# Patient Record
Sex: FEMALE | HISPANIC OR LATINO | Employment: UNEMPLOYED | URBAN - METROPOLITAN AREA
[De-identification: names, ages, dates, MRNs, and addresses within clinical notes are randomized per-mention and may not be internally consistent; named-entity substitution may affect disease eponyms.]

---

## 2021-01-15 DIAGNOSIS — Z23 NEED FOR VACCINATION: ICD-10-CM

## 2024-10-31 ENCOUNTER — OFFICE VISIT (OUTPATIENT)
Dept: URGENT CARE | Facility: CLINIC | Age: 74
End: 2024-10-31
Payer: MEDICARE

## 2024-10-31 VITALS
HEART RATE: 87 BPM | HEIGHT: 62 IN | WEIGHT: 164.2 LBS | RESPIRATION RATE: 15 BRPM | TEMPERATURE: 98.4 F | SYSTOLIC BLOOD PRESSURE: 140 MMHG | OXYGEN SATURATION: 94 % | DIASTOLIC BLOOD PRESSURE: 72 MMHG | BODY MASS INDEX: 30.22 KG/M2

## 2024-10-31 DIAGNOSIS — K21.9 GASTROESOPHAGEAL REFLUX DISEASE, UNSPECIFIED WHETHER ESOPHAGITIS PRESENT: ICD-10-CM

## 2024-10-31 PROCEDURE — 3078F DIAST BP <80 MM HG: CPT

## 2024-10-31 PROCEDURE — 3077F SYST BP >= 140 MM HG: CPT

## 2024-10-31 PROCEDURE — 99214 OFFICE O/P EST MOD 30 MIN: CPT

## 2024-10-31 RX ORDER — BACLOFEN 10 MG/1
10 TABLET ORAL NIGHTLY PRN
Qty: 30 TABLET | Refills: 0 | Status: SHIPPED | OUTPATIENT
Start: 2024-10-31 | End: 2024-10-31

## 2024-10-31 RX ORDER — FAMOTIDINE 20 MG/1
20 TABLET, FILM COATED ORAL 2 TIMES DAILY
Qty: 60 TABLET | Refills: 0 | Status: SHIPPED | OUTPATIENT
Start: 2024-10-31

## 2024-10-31 RX ORDER — SIMETHICONE 80 MG
80 TABLET,CHEWABLE ORAL EVERY 6 HOURS PRN
Qty: 30 TABLET | Refills: 3 | Status: SHIPPED | OUTPATIENT
Start: 2024-10-31

## 2024-10-31 RX ORDER — BACLOFEN 10 MG/1
10 TABLET ORAL NIGHTLY PRN
Qty: 30 TABLET | Refills: 0 | Status: SHIPPED | OUTPATIENT
Start: 2024-10-31

## 2024-10-31 RX ORDER — FAMOTIDINE 20 MG/1
20 TABLET, FILM COATED ORAL 2 TIMES DAILY
Qty: 60 TABLET | Refills: 0 | Status: SHIPPED | OUTPATIENT
Start: 2024-10-31 | End: 2024-10-31

## 2024-10-31 RX ORDER — LISINOPRIL 5 MG/1
5 TABLET ORAL DAILY
COMMUNITY

## 2024-11-01 ASSESSMENT — ENCOUNTER SYMPTOMS
HEADACHES: 0
PALPITATIONS: 0
SORE THROAT: 0
VOMITING: 0
DIARRHEA: 0
FEVER: 0
CHOKING: 0
SHORTNESS OF BREATH: 0
WEIGHT LOSS: 0
HEMOPTYSIS: 0
BELCHING: 1
CHILLS: 0
ABDOMINAL PAIN: 0
WATER BRASH: 0
GLOBUS SENSATION: 0
FATIGUE: 0
HOARSE VOICE: 0
PND: 0
EARLY SATIETY: 0
ORTHOPNEA: 0
COUGH: 1
BLOOD IN STOOL: 0
NAUSEA: 0
CLAUDICATION: 0
SPUTUM PRODUCTION: 0
CONSTIPATION: 0
HEARTBURN: 1
WHEEZING: 0
STRIDOR: 0
ORTHOPNEA: 0

## 2024-11-01 NOTE — PROGRESS NOTES
"Subjective:   Shraddha Chong is a 74 y.o. female who presents for Gastrophageal Reflux (X 1 week been having acid refluxes , suspended omeprazole due to causing more acid refluxes . Heart bun all day)      Gastrophageal Reflux      ROS  Refer to HPI for additional details.    During this visit, appropriate PPE was worn, and hand hygiene was performed.    PMH:  has a past medical history of Hypertension and Type II or unspecified type diabetes mellitus without mention of complication, not stated as uncontrolled.    MEDS:   Current Outpatient Medications:   •  omeprazole (PRILOSEC) 20 MG delayed-release capsule, Take 20 mg by mouth every day., Disp: , Rfl:   •  lisinopril (PRINIVIL) 5 MG Tab, Take 5 mg by mouth every day., Disp: , Rfl:   •  ibuprofen (MOTRIN) 600 MG TABS, Take 1 Tab by mouth every 6 hours as needed., Disp: 20 Tab, Rfl: 0  •  NON SPECIFIED, BP medication, Disp: , Rfl:   •  metformin (GLUCOPHAGE) 500 MG TABS, Take 500 mg by mouth 2 times a day, with meals., Disp: , Rfl:   •  cyclobenzaprine (FLEXERIL) 10 MG TABS, Take 5 mg by mouth 3 times a day as needed. (Patient not taking: Reported on 10/31/2024), Disp: , Rfl:   •  lansoprazole (PREVACID) 30 MG CPDR, Take 30 mg by mouth every day.   (Patient not taking: Reported on 10/31/2024), Disp: , Rfl:     ALLERGIES: No Known Allergies  SURGHX: History reviewed. No pertinent surgical history.  SOCHX:  reports that she has never smoked. She has never used smokeless tobacco. She reports that she does not drink alcohol and does not use drugs.    FH: Per HPI as applicable/pertinent.    Medications, Allergies, and current problem list reviewed today in Epic.     Objective:     BP (!) 140/72   Pulse 87   Temp 36.9 °C (98.4 °F)   Resp 15   Ht 1.575 m (5' 2\")   Wt 74.5 kg (164 lb 3.2 oz)   SpO2 94%     Physical Exam    Assessment/Plan:     Diagnosis and associated orders:     There are no diagnoses linked to this encounter.   Comments/MDM:        "       Differential diagnosis, natural history, supportive care, and indications for immediate follow-up discussed.    Advised the patient to follow-up with the primary care physician for recheck, reevaluation, and consideration of further management.    Please note that this dictation was created using voice recognition software. I have made a reasonable attempt to correct obvious errors, but I expect that there are errors of grammar and possibly content that I did not discover before finalizing the note.    This note was electronically signed by DEVON Jackson       "Tab, Take 5 mg by mouth every day., Disp: , Rfl:     simethicone (MYLICON) 80 MG Chew Tab, Chew 1 Tablet every 6 hours as needed for Flatulence., Disp: 30 Tablet, Rfl: 3    baclofen (LIORESAL) 10 MG Tab, Take 1 Tablet by mouth at bedtime as needed (for persistent acid reflux or heartburn)., Disp: 30 Tablet, Rfl: 0    famotidine (PEPCID) 20 MG Tab, Take 1 Tablet by mouth 2 times a day., Disp: 60 Tablet, Rfl: 0    ibuprofen (MOTRIN) 600 MG TABS, Take 1 Tab by mouth every 6 hours as needed., Disp: 20 Tab, Rfl: 0    NON SPECIFIED, BP medication, Disp: , Rfl:     metformin (GLUCOPHAGE) 500 MG TABS, Take 500 mg by mouth 2 times a day, with meals., Disp: , Rfl:     ALLERGIES: No Known Allergies  SURGHX: History reviewed. No pertinent surgical history.  SOCHX:  reports that she has never smoked. She has never used smokeless tobacco. She reports that she does not drink alcohol and does not use drugs.    FH: Per HPI as applicable/pertinent.    Medications, Allergies, and current problem list reviewed today in Epic.     Objective:     BP (!) 140/72   Pulse 87   Temp 36.9 °C (98.4 °F)   Resp 15   Ht 1.575 m (5' 2\")   Wt 74.5 kg (164 lb 3.2 oz)   SpO2 94%     Physical Exam  Vitals reviewed.   Constitutional:       General: She is not in acute distress.     Appearance: She is not ill-appearing.   HENT:      Nose: Nose normal. No congestion or rhinorrhea.      Mouth/Throat:      Mouth: Mucous membranes are moist.      Pharynx: Oropharynx is clear. No oropharyngeal exudate or posterior oropharyngeal erythema.      Comments: No obvious signs of erosion or bleeding.  Dentition is intact no molar decay or signs of infection.  No PTA.  Tonsils are without swelling erythema or exudates  Eyes:      Pupils: Pupils are equal, round, and reactive to light.   Cardiovascular:      Rate and Rhythm: Normal rate.      Pulses: Normal pulses.   Pulmonary:      Effort: Pulmonary effort is normal.   Abdominal:      General: Abdomen is flat. " There is no distension.      Palpations: There is no mass.      Tenderness: There is no abdominal tenderness. There is no right CVA tenderness, left CVA tenderness, guarding or rebound.      Hernia: No hernia is present.   Genitourinary:     Comments: Deferred and not indicated  Musculoskeletal:      Cervical back: Normal range of motion.   Skin:     General: Skin is warm and dry.   Neurological:      Mental Status: She is alert.         Assessment/Plan:     Diagnosis and associated orders:     1. Gastroesophageal reflux disease, unspecified whether esophagitis present  - Referral to Gastroenterology  - simethicone (MYLICON) 80 MG Chew Tab; Chew 1 Tablet every 6 hours as needed for Flatulence.  Dispense: 30 Tablet; Refill: 3  - baclofen (LIORESAL) 10 MG Tab; Take 1 Tablet by mouth at bedtime as needed (for persistent acid reflux or heartburn).  Dispense: 30 Tablet; Refill: 0  - famotidine (PEPCID) 20 MG Tab; Take 1 Tablet by mouth 2 times a day.  Dispense: 60 Tablet; Refill: 0    Other orders  - lisinopril (PRINIVIL) 5 MG Tab; Take 5 mg by mouth every day.     Comments/MDM:     Patient history and physical exam are consistent with acute episode of breakthrough esophagitis/acid reflux on chronic gastroesophageal reflux.  Patient has had relief from her symptoms over the last few years by managing her diet as well as daily omeprazole with as needed Tums.  In the last week her symptoms have not been responsive to these therapies and are starting to affect her sleep and ADLs.  In clinic she is overall benign presenting, though she does endorse her frustration and not being able to find relief.  I do have some suspicion that patient may be experiencing symptoms from lack of efficacy in her PPI due to prolonged use without intermittent breaks.  I discussed with patient that I do think she should establish with a GI as this has been an ongoing problem and she is unable to manage with frontline therapies and  treatment.  Patient was given GI cocktail in clinic, stated afforded her much relief.  I discussed extensively with patient and her daughter about treatment plan.  At this time I advised to discontinue use of PPI, can start famotidine with as needed simethicone, and as needed baclofen nightly to help with nighttime symptoms and esophageal relaxation.  I did advise that I do not think patient should be on this medication long-term, though for now she can use these meds until she can be evaluated by GI.  Outpatient management will consist of continued dietary modifications, hydration, increase exercise, continue no eating ideally 4 hours prior to lying down and sleeping, start famotidine twice daily, discontinue PPI, baclofen as needed  Follow up in 3-5 days if no improvement in symptoms  ED precautions given for any esophageal bleeding, hematemesis, substernal radiating chest pain.  Discussion and collaborative decision making used with the patient myself to develop treatment plan.  Patient understands the treatment plan of care, and further follow-up if needed.  No further questions           Differential diagnosis, natural history, supportive care, and indications for immediate follow-up discussed.    Advised the patient to follow-up with the primary care physician for recheck, reevaluation, and consideration of further management.    Please note that this dictation was created using voice recognition software. I have made a reasonable attempt to correct obvious errors, but I expect that there are errors of grammar and possibly content that I did not discover before finalizing the note.    This note was electronically signed by DVEON Jackson

## 2024-11-20 ENCOUNTER — OFFICE VISIT (OUTPATIENT)
Dept: URGENT CARE | Facility: PHYSICIAN GROUP | Age: 74
End: 2024-11-20
Payer: MEDICARE

## 2024-11-20 VITALS
TEMPERATURE: 98.4 F | HEIGHT: 62 IN | SYSTOLIC BLOOD PRESSURE: 110 MMHG | BODY MASS INDEX: 29.63 KG/M2 | WEIGHT: 161 LBS | DIASTOLIC BLOOD PRESSURE: 58 MMHG | OXYGEN SATURATION: 95 % | HEART RATE: 84 BPM | RESPIRATION RATE: 16 BRPM

## 2024-11-20 DIAGNOSIS — K21.9 GASTROESOPHAGEAL REFLUX DISEASE, UNSPECIFIED WHETHER ESOPHAGITIS PRESENT: ICD-10-CM

## 2024-11-20 PROCEDURE — 3074F SYST BP LT 130 MM HG: CPT | Performed by: FAMILY MEDICINE

## 2024-11-20 PROCEDURE — 99213 OFFICE O/P EST LOW 20 MIN: CPT | Performed by: FAMILY MEDICINE

## 2024-11-20 PROCEDURE — 3078F DIAST BP <80 MM HG: CPT | Performed by: FAMILY MEDICINE

## 2024-11-20 RX ORDER — LANSOPRAZOLE 30 MG/1
30 CAPSULE, DELAYED RELEASE ORAL DAILY
Qty: 30 CAPSULE | Refills: 0 | Status: SHIPPED | OUTPATIENT
Start: 2024-11-20 | End: 2024-12-20

## 2024-11-20 NOTE — PROGRESS NOTES
"Subjective     Shraddha Chong is a 74 y.o. female who presents with Abdominal Pain (Since 10-31-24 with GERD.  Daughter states that BP dropped on Saturday and she felt faint. GI appointment in a month. )            3 weeks acid reflux. Worse after eating. Worse after laying down. Constipation with antacids. Gets temporary relief with Tums. No caffeine, no EtOH. No chocolate consumption.  Last night had severe symptoms despite famotidine.  Normal BM/no melena.  Has GI follow-up scheduled.  No other aggravating or alleviating factors.        Review of Systems   Constitutional:  Negative for malaise/fatigue and weight loss.   Eyes:  Negative for discharge and redness.   Gastrointestinal:  Negative for nausea and vomiting.   Musculoskeletal:  Negative for joint pain and myalgias.   Skin:  Negative for itching and rash.              Objective     /58   Pulse 84   Temp 36.9 °C (98.4 °F) (Temporal)   Resp 16   Ht 1.575 m (5' 2\")   Wt 73 kg (161 lb)   SpO2 95%   BMI 29.45 kg/m²      Physical Exam  Constitutional:       General: She is not in acute distress.     Appearance: She is well-developed.   HENT:      Head: Normocephalic and atraumatic.      Mouth/Throat:      Mouth: Mucous membranes are moist.      Pharynx: No posterior oropharyngeal erythema.   Eyes:      Conjunctiva/sclera: Conjunctivae normal.   Cardiovascular:      Rate and Rhythm: Normal rate and regular rhythm.      Heart sounds: Normal heart sounds. No murmur heard.  Pulmonary:      Effort: Pulmonary effort is normal.      Breath sounds: Normal breath sounds. No wheezing.   Abdominal:      Palpations: Abdomen is soft.      Tenderness: There is abdominal tenderness (Mild epigastric).   Skin:     General: Skin is warm and dry.      Findings: No rash.   Neurological:      Mental Status: She is alert.                             Assessment & Plan   Urgent care visit 10/31/2024 reviewed     Assessment & Plan  Gastroesophageal reflux disease, " unspecified whether esophagitis present    Orders:    lansoprazole (PREVACID) 30 MG CAPSULE DELAYED RELEASE; Take 1 Capsule by mouth every day for 30 days.     Differential diagnosis, natural history, supportive care, and indications for immediate follow-up were discussed.     Recommend small meals, more frequent if necessary.    F/u GI

## 2024-11-21 ASSESSMENT — ENCOUNTER SYMPTOMS
NAUSEA: 0
EYE DISCHARGE: 0
MYALGIAS: 0
VOMITING: 0
WEIGHT LOSS: 0
EYE REDNESS: 0

## 2024-12-12 DIAGNOSIS — K21.9 GASTROESOPHAGEAL REFLUX DISEASE, UNSPECIFIED WHETHER ESOPHAGITIS PRESENT: ICD-10-CM

## 2024-12-17 RX ORDER — LANSOPRAZOLE 30 MG/1
CAPSULE, DELAYED RELEASE ORAL
Qty: 90 CAPSULE | Refills: 1 | Status: SHIPPED | OUTPATIENT
Start: 2024-12-17

## 2025-01-03 ENCOUNTER — HOSPITAL ENCOUNTER (OUTPATIENT)
Dept: RADIOLOGY | Facility: MEDICAL CENTER | Age: 75
End: 2025-01-03
Attending: NURSE PRACTITIONER
Payer: MEDICARE

## 2025-01-03 DIAGNOSIS — K21.9 CHALASIA OF LOWER ESOPHAGEAL SPHINCTER: ICD-10-CM

## 2025-01-03 PROCEDURE — 74220 X-RAY XM ESOPHAGUS 1CNTRST: CPT

## 2025-01-03 PROCEDURE — 700117 HCHG RX CONTRAST REV CODE 255: Performed by: NURSE PRACTITIONER

## 2025-01-03 RX ADMIN — BARIUM SULFATE 700 MG: 700 TABLET ORAL at 12:22

## 2025-01-20 ENCOUNTER — OFFICE VISIT (OUTPATIENT)
Dept: MEDICAL GROUP | Age: 75
End: 2025-01-20
Payer: MEDICARE

## 2025-01-20 VITALS
BODY MASS INDEX: 29.7 KG/M2 | WEIGHT: 161.4 LBS | DIASTOLIC BLOOD PRESSURE: 60 MMHG | TEMPERATURE: 98 F | SYSTOLIC BLOOD PRESSURE: 130 MMHG | OXYGEN SATURATION: 98 % | HEART RATE: 74 BPM | HEIGHT: 62 IN

## 2025-01-20 DIAGNOSIS — Z12.11 COLON CANCER SCREENING: ICD-10-CM

## 2025-01-20 DIAGNOSIS — I10 ESSENTIAL HYPERTENSION: ICD-10-CM

## 2025-01-20 DIAGNOSIS — R73.03 PREDIABETES: ICD-10-CM

## 2025-01-20 DIAGNOSIS — Z11.59 ENCOUNTER FOR HCV SCREENING TEST FOR HIGH RISK PATIENT: ICD-10-CM

## 2025-01-20 DIAGNOSIS — K21.9 GASTROESOPHAGEAL REFLUX DISEASE WITHOUT ESOPHAGITIS: ICD-10-CM

## 2025-01-20 DIAGNOSIS — Z78.0 POST-MENOPAUSAL: ICD-10-CM

## 2025-01-20 DIAGNOSIS — E55.9 VITAMIN D INSUFFICIENCY: ICD-10-CM

## 2025-01-20 DIAGNOSIS — E78.5 DYSLIPIDEMIA: ICD-10-CM

## 2025-01-20 DIAGNOSIS — Z91.89 ENCOUNTER FOR HCV SCREENING TEST FOR HIGH RISK PATIENT: ICD-10-CM

## 2025-01-20 PROCEDURE — 3078F DIAST BP <80 MM HG: CPT | Performed by: STUDENT IN AN ORGANIZED HEALTH CARE EDUCATION/TRAINING PROGRAM

## 2025-01-20 PROCEDURE — 3075F SYST BP GE 130 - 139MM HG: CPT | Performed by: STUDENT IN AN ORGANIZED HEALTH CARE EDUCATION/TRAINING PROGRAM

## 2025-01-20 PROCEDURE — 99214 OFFICE O/P EST MOD 30 MIN: CPT | Performed by: STUDENT IN AN ORGANIZED HEALTH CARE EDUCATION/TRAINING PROGRAM

## 2025-01-20 ASSESSMENT — ENCOUNTER SYMPTOMS
CHILLS: 0
BRUISES/BLEEDS EASILY: 0
PALPITATIONS: 0
DOUBLE VISION: 0
ORTHOPNEA: 0
BLOOD IN STOOL: 0
DEPRESSION: 0
SHORTNESS OF BREATH: 0
ABDOMINAL PAIN: 0
HEADACHES: 0
DIZZINESS: 0
BACK PAIN: 0
COUGH: 0
WEAKNESS: 0
BLURRED VISION: 0
WHEEZING: 0
FEVER: 0

## 2025-01-20 ASSESSMENT — PATIENT HEALTH QUESTIONNAIRE - PHQ9: CLINICAL INTERPRETATION OF PHQ2 SCORE: 0

## 2025-01-20 NOTE — PROGRESS NOTES
"Subjective:     CC: Establish care, new patient.    HISTORY OF THE PRESENT ILLNESS:   Patient is a 74 y.o. female. This pleasant patient is here today to establish care and discuss chronic conditions, current medication therapy and screening.  [facility]  No records available from recent visits to her previous primary care physician.  Patient also has not had blood work done in over a year.  She otherwise states feeling well.  She has been treated for hypertension, GERD, and prediabetes.  Patient is currently taking lisinopril 10 mg daily, metformin 850 mg daily and lansoprazole 30 mg daily.  Recently she has been following with a gastroenterologist, however she cannot recall the physician's name.      ROS:   Review of Systems   Constitutional:  Negative for chills, fever and malaise/fatigue.   HENT:  Negative for nosebleeds and tinnitus.    Eyes:  Negative for blurred vision and double vision.   Respiratory:  Negative for cough, shortness of breath and wheezing.    Cardiovascular:  Negative for chest pain, palpitations, orthopnea and leg swelling.   Gastrointestinal:  Negative for abdominal pain, blood in stool and melena.   Genitourinary:  Negative for dysuria and urgency.   Musculoskeletal:  Negative for back pain and joint pain.   Skin:  Negative for rash.   Neurological:  Negative for dizziness, weakness and headaches.   Endo/Heme/Allergies:  Does not bruise/bleed easily.   Psychiatric/Behavioral:  Negative for depression and suicidal ideas.          Objective:     Exam: /60 (BP Location: Left arm, Patient Position: Sitting, BP Cuff Size: Large adult)   Pulse 74   Temp 36.7 °C (98 °F) (Temporal)   Ht 1.575 m (5' 2\")   Wt 73.2 kg (161 lb 6.4 oz)   SpO2 98%  Body mass index is 29.52 kg/m².    Physical Exam  Vitals reviewed.   Constitutional:       General: She is not in acute distress.  HENT:      Head: Normocephalic and atraumatic.      " Mouth/Throat:      Mouth: Mucous membranes are moist.   Eyes:      General: No scleral icterus.     Extraocular Movements: Extraocular movements intact.      Pupils: Pupils are equal, round, and reactive to light.   Cardiovascular:      Rate and Rhythm: Normal rate and regular rhythm.      Pulses: Normal pulses.      Heart sounds: Normal heart sounds. No murmur heard.  Pulmonary:      Effort: Pulmonary effort is normal. No respiratory distress.      Breath sounds: Normal breath sounds. No wheezing.   Abdominal:      General: There is no distension.      Palpations: Abdomen is soft.      Tenderness: There is no abdominal tenderness. There is no guarding or rebound.   Musculoskeletal:      Cervical back: Normal range of motion and neck supple.      Right lower leg: No edema.      Left lower leg: No edema.   Skin:     General: Skin is warm.      Capillary Refill: Capillary refill takes less than 2 seconds.      Coloration: Skin is not jaundiced.   Neurological:      General: No focal deficit present.      Mental Status: She is alert.      Cranial Nerves: No cranial nerve deficit.      Sensory: No sensory deficit.   Psychiatric:         Mood and Affect: Mood normal.         Behavior: Behavior normal.       Labs: Ordered    Assessment & Plan:   74 y.o. female with the following -    1. Essential hypertension  -Chronic, apparently stable  -Patient stated her blood pressure at home is regularly at goal  -Blood pressure today at our office was 130/60  -She is currently taking lisinopril 10 mg daily.  -Encourage weight loss, regular exercise, continue same therapy and monitor blood pressure at home at least 2 or 3 times per week.    - CBC WITH DIFFERENTIAL; Future  - Comp Metabolic Panel; Future  - TSH; Future    2. Dyslipidemia  -History of dyslipidemia, however she is not taking any medical therapy and I do not see any blood work done recently in her chart.  -Recommend to repeat blood work to calculate ASCVD score    -  Lipid Profile; Future    3. Prediabetes  -Unclear if she was diagnosed with prediabetes or diabetes since she has been on medical therapy with metformin 850 mg daily  -Per chart review no recent A1c  -Recommended to check A1c right now and also stop metformin  -I will also recheck her A1c in another 3 months    - HEMOGLOBIN A1C; Future  - VITAMIN B12; Future    4. Gastroesophageal reflux disease without esophagitis  -Chronic, apparently stable  -Currently following with a gastroenterologist  -She is taking lansoprazole 30 mg daily    5. Vitamin D insufficiency  -Screening for vitamin D deficiency/insufficiency  - VITAMIN D,25 HYDROXY (DEFICIENCY); Future    6. Post-menopausal  -Screening for osteoporosis  - DS-BONE DENSITY STUDY (DEXA); Future    7. Colon cancer screening  -Due for screening  - Referral to GI for Colonoscopy    8. Encounter for HCV screening test for high risk patient  -Routine screening  - HEP C VIRUS ANTIBODY; Future       Return in about 2 weeks (around 2/3/2025) for Labs.    Please note that this dictation was created using voice recognition software. I have made every reasonable attempt to correct obvious errors, but I expect that there are errors of grammar and possibly content that I did not discover before finalizing the note.

## 2025-01-22 ENCOUNTER — HOSPITAL ENCOUNTER (OUTPATIENT)
Dept: RADIOLOGY | Facility: MEDICAL CENTER | Age: 75
End: 2025-01-22
Attending: STUDENT IN AN ORGANIZED HEALTH CARE EDUCATION/TRAINING PROGRAM
Payer: MEDICARE

## 2025-01-22 DIAGNOSIS — Z78.0 POST-MENOPAUSAL: ICD-10-CM

## 2025-01-22 PROCEDURE — 77080 DXA BONE DENSITY AXIAL: CPT

## 2025-01-25 ENCOUNTER — HOSPITAL ENCOUNTER (OUTPATIENT)
Dept: LAB | Facility: MEDICAL CENTER | Age: 75
End: 2025-01-25
Attending: STUDENT IN AN ORGANIZED HEALTH CARE EDUCATION/TRAINING PROGRAM
Payer: MEDICARE

## 2025-01-25 DIAGNOSIS — Z11.59 ENCOUNTER FOR HCV SCREENING TEST FOR HIGH RISK PATIENT: ICD-10-CM

## 2025-01-25 DIAGNOSIS — I10 ESSENTIAL HYPERTENSION: ICD-10-CM

## 2025-01-25 DIAGNOSIS — Z91.89 ENCOUNTER FOR HCV SCREENING TEST FOR HIGH RISK PATIENT: ICD-10-CM

## 2025-01-25 DIAGNOSIS — E55.9 VITAMIN D INSUFFICIENCY: ICD-10-CM

## 2025-01-25 DIAGNOSIS — R73.03 PREDIABETES: ICD-10-CM

## 2025-01-25 DIAGNOSIS — E78.5 DYSLIPIDEMIA: ICD-10-CM

## 2025-01-25 LAB
25(OH)D3 SERPL-MCNC: 38 NG/ML (ref 30–100)
ALBUMIN SERPL BCP-MCNC: 4 G/DL (ref 3.2–4.9)
ALBUMIN/GLOB SERPL: 1.3 G/DL
ALP SERPL-CCNC: 80 U/L (ref 30–99)
ALT SERPL-CCNC: 30 U/L (ref 2–50)
ANION GAP SERPL CALC-SCNC: 9 MMOL/L (ref 7–16)
AST SERPL-CCNC: 21 U/L (ref 12–45)
BASOPHILS # BLD AUTO: 1.3 % (ref 0–1.8)
BASOPHILS # BLD: 0.1 K/UL (ref 0–0.12)
BILIRUB SERPL-MCNC: 0.4 MG/DL (ref 0.1–1.5)
BUN SERPL-MCNC: 16 MG/DL (ref 8–22)
CALCIUM ALBUM COR SERPL-MCNC: 9.1 MG/DL (ref 8.5–10.5)
CALCIUM SERPL-MCNC: 9.1 MG/DL (ref 8.5–10.5)
CHLORIDE SERPL-SCNC: 104 MMOL/L (ref 96–112)
CHOLEST SERPL-MCNC: 190 MG/DL (ref 100–199)
CO2 SERPL-SCNC: 26 MMOL/L (ref 20–33)
CREAT SERPL-MCNC: 0.63 MG/DL (ref 0.5–1.4)
EOSINOPHIL # BLD AUTO: 0.46 K/UL (ref 0–0.51)
EOSINOPHIL NFR BLD: 6 % (ref 0–6.9)
ERYTHROCYTE [DISTWIDTH] IN BLOOD BY AUTOMATED COUNT: 50 FL (ref 35.9–50)
EST. AVERAGE GLUCOSE BLD GHB EST-MCNC: 148 MG/DL
FASTING STATUS PATIENT QL REPORTED: NORMAL
GFR SERPLBLD CREATININE-BSD FMLA CKD-EPI: 92 ML/MIN/1.73 M 2
GLOBULIN SER CALC-MCNC: 3.2 G/DL (ref 1.9–3.5)
GLUCOSE SERPL-MCNC: 122 MG/DL (ref 65–99)
HBA1C MFR BLD: 6.8 % (ref 4–5.6)
HCT VFR BLD AUTO: 39.5 % (ref 37–47)
HCV AB SER QL: NORMAL
HDLC SERPL-MCNC: 46 MG/DL
HGB BLD-MCNC: 12.1 G/DL (ref 12–16)
IMM GRANULOCYTES # BLD AUTO: 0.01 K/UL (ref 0–0.11)
IMM GRANULOCYTES NFR BLD AUTO: 0.1 % (ref 0–0.9)
LDLC SERPL CALC-MCNC: 121 MG/DL
LYMPHOCYTES # BLD AUTO: 2.38 K/UL (ref 1–4.8)
LYMPHOCYTES NFR BLD: 31.2 % (ref 22–41)
MCH RBC QN AUTO: 24.8 PG (ref 27–33)
MCHC RBC AUTO-ENTMCNC: 30.6 G/DL (ref 32.2–35.5)
MCV RBC AUTO: 80.9 FL (ref 81.4–97.8)
MONOCYTES # BLD AUTO: 0.55 K/UL (ref 0–0.85)
MONOCYTES NFR BLD AUTO: 7.2 % (ref 0–13.4)
NEUTROPHILS # BLD AUTO: 4.13 K/UL (ref 1.82–7.42)
NEUTROPHILS NFR BLD: 54.2 % (ref 44–72)
NRBC # BLD AUTO: 0 K/UL
NRBC BLD-RTO: 0 /100 WBC (ref 0–0.2)
PLATELET # BLD AUTO: 369 K/UL (ref 164–446)
PMV BLD AUTO: 10.3 FL (ref 9–12.9)
POTASSIUM SERPL-SCNC: 4.4 MMOL/L (ref 3.6–5.5)
PROT SERPL-MCNC: 7.2 G/DL (ref 6–8.2)
RBC # BLD AUTO: 4.88 M/UL (ref 4.2–5.4)
SODIUM SERPL-SCNC: 139 MMOL/L (ref 135–145)
TRIGL SERPL-MCNC: 116 MG/DL (ref 0–149)
TSH SERPL-ACNC: 6.08 UIU/ML (ref 0.35–5.5)
VIT B12 SERPL-MCNC: 964 PG/ML (ref 211–911)
WBC # BLD AUTO: 7.6 K/UL (ref 4.8–10.8)

## 2025-01-25 PROCEDURE — 80053 COMPREHEN METABOLIC PANEL: CPT

## 2025-01-25 PROCEDURE — 80061 LIPID PANEL: CPT

## 2025-01-25 PROCEDURE — 84443 ASSAY THYROID STIM HORMONE: CPT

## 2025-01-25 PROCEDURE — 82306 VITAMIN D 25 HYDROXY: CPT

## 2025-01-25 PROCEDURE — 85025 COMPLETE CBC W/AUTO DIFF WBC: CPT

## 2025-01-25 PROCEDURE — 86803 HEPATITIS C AB TEST: CPT

## 2025-01-25 PROCEDURE — 36415 COLL VENOUS BLD VENIPUNCTURE: CPT

## 2025-01-25 PROCEDURE — 82607 VITAMIN B-12: CPT

## 2025-01-25 PROCEDURE — 83036 HEMOGLOBIN GLYCOSYLATED A1C: CPT | Mod: GA

## 2025-02-11 ENCOUNTER — OFFICE VISIT (OUTPATIENT)
Dept: MEDICAL GROUP | Age: 75
End: 2025-02-11
Payer: MEDICARE

## 2025-02-11 VITALS
SYSTOLIC BLOOD PRESSURE: 122 MMHG | OXYGEN SATURATION: 96 % | HEIGHT: 62 IN | TEMPERATURE: 98.5 F | HEART RATE: 72 BPM | WEIGHT: 162 LBS | DIASTOLIC BLOOD PRESSURE: 64 MMHG | BODY MASS INDEX: 29.81 KG/M2

## 2025-02-11 DIAGNOSIS — R79.89 ELEVATED TSH: ICD-10-CM

## 2025-02-11 DIAGNOSIS — M85.88 OSTEOPENIA OF LUMBAR SPINE: ICD-10-CM

## 2025-02-11 DIAGNOSIS — E78.2 MIXED DYSLIPIDEMIA: ICD-10-CM

## 2025-02-11 DIAGNOSIS — E11.9 TYPE 2 DIABETES MELLITUS WITHOUT COMPLICATION, WITHOUT LONG-TERM CURRENT USE OF INSULIN (HCC): ICD-10-CM

## 2025-02-11 PROCEDURE — 3078F DIAST BP <80 MM HG: CPT | Performed by: STUDENT IN AN ORGANIZED HEALTH CARE EDUCATION/TRAINING PROGRAM

## 2025-02-11 PROCEDURE — 3074F SYST BP LT 130 MM HG: CPT | Performed by: STUDENT IN AN ORGANIZED HEALTH CARE EDUCATION/TRAINING PROGRAM

## 2025-02-11 PROCEDURE — 99214 OFFICE O/P EST MOD 30 MIN: CPT | Performed by: STUDENT IN AN ORGANIZED HEALTH CARE EDUCATION/TRAINING PROGRAM

## 2025-02-11 RX ORDER — ATORVASTATIN CALCIUM 20 MG/1
20 TABLET, FILM COATED ORAL NIGHTLY
Qty: 100 TABLET | Refills: 3 | Status: SHIPPED | OUTPATIENT
Start: 2025-02-11 | End: 2026-03-18

## 2025-02-11 ASSESSMENT — ENCOUNTER SYMPTOMS
BLOOD IN STOOL: 0
DIZZINESS: 0
SHORTNESS OF BREATH: 0
PALPITATIONS: 0
BRUISES/BLEEDS EASILY: 0
DEPRESSION: 0
FEVER: 0
HEADACHES: 0
BLURRED VISION: 0
CHILLS: 0
WEAKNESS: 0
BACK PAIN: 0
COUGH: 0
WHEEZING: 0
ORTHOPNEA: 0
ABDOMINAL PAIN: 0
DOUBLE VISION: 0

## 2025-02-11 ASSESSMENT — FIBROSIS 4 INDEX: FIB4 SCORE: 0.78

## 2025-02-11 NOTE — PATIENT INSTRUCTIONS
-Start Metformin 850 mg daily  -Strict diabetic diet  -Start lipitor 20 mg daily  -Regular exercise  -Follow up in 3 months  -Lab work before appt

## 2025-02-11 NOTE — PROGRESS NOTES
"Subjective:     CC: Lab review follow-up    HPI:   Shraddha presents today for a scheduled visit for lab review follow-up.  Patient has a past medical history of dyslipidemia, hypertension and prediabetes.  Repeat blood work showed worsening A1c, currently 6.8%, complete oblique panel with a fasting blood glucose of 122, CBC showed macrocytosis, lipid profile showed elevated LDL, vitamin D was normal, vitamin B12 was elevated, TSH was mildly elevated and hep C antibody was nonreactive.  DEXA scan showed osteopenia of the lumbar spine.      Problem   Type 2 Diabetes Mellitus Without Complication, Without Long-Term Current Use of Insulin ([facility])       ROS:  Review of Systems   Constitutional:  Negative for chills, fever and malaise/fatigue.   HENT:  Negative for nosebleeds and tinnitus.    Eyes:  Negative for blurred vision and double vision.   Respiratory:  Negative for cough, shortness of breath and wheezing.    Cardiovascular:  Negative for chest pain, palpitations, orthopnea and leg swelling.   Gastrointestinal:  Negative for abdominal pain, blood in stool and melena.   Genitourinary:  Negative for dysuria, frequency and urgency.   Musculoskeletal:  Negative for back pain and joint pain.   Skin:  Negative for rash.   Neurological:  Negative for dizziness, weakness and headaches.   Endo/Heme/Allergies:  Does not bruise/bleed easily.   Psychiatric/Behavioral:  Negative for depression and suicidal ideas.        Objective:     Exam:  /64 (BP Location: Left arm, Patient Position: Sitting, BP Cuff Size: Adult)   Pulse 72   Temp 36.9 °C (98.5 °F) (Temporal)   Ht 1.575 m (5' 2\")   Wt 73.5 kg (162 lb)   SpO2 96%   BMI 29.63 kg/m²  Body mass index is 29.63 kg/m².    Physical Exam  Vitals reviewed.   Constitutional:       General: She is not in acute distress.  HENT:      Head: Normocephalic and atraumatic.      Mouth/Throat:      Mouth: Mucous membranes are moist.   Eyes:      Extraocular Movements: Extraocular " movements intact.      Pupils: Pupils are equal, round, and reactive to light.   Cardiovascular:      Rate and Rhythm: Normal rate and regular rhythm.      Pulses: Normal pulses.      Heart sounds: Normal heart sounds. No murmur heard.  Pulmonary:      Effort: Pulmonary effort is normal. No respiratory distress.      Breath sounds: Normal breath sounds. No wheezing.   Abdominal:      General: There is no distension.      Palpations: Abdomen is soft.      Tenderness: There is no abdominal tenderness. There is no guarding or rebound.   Musculoskeletal:      Cervical back: Normal range of motion and neck supple.      Right lower leg: No edema.      Left lower leg: No edema.   Skin:     General: Skin is warm.      Capillary Refill: Capillary refill takes less than 2 seconds.      Coloration: Skin is not jaundiced.   Neurological:      General: No focal deficit present.      Mental Status: She is alert.   Psychiatric:         Mood and Affect: Mood normal.         Behavior: Behavior normal.       Labs: Reviewed    Assessment & Plan:     74 y.o. female with the following -     1. Type 2 diabetes mellitus without complication, without long-term current use of insulin ([facility])  -History of prediabetes, however current A1c was significantly evaded 6.8%  -Recommended to start metformin 850 mg daily and repeat A1c in 3 months  -Encouraged to exercise regularly at least 40 minutes 5 days a week and a strict diabetic diet or plant-based diet.  - metFORMIN (GLUCOPHAGE) 850 MG Tab; Take 1 Tablet by mouth every day.  Dispense: 100 Tablet; Refill: 3  - HEMOGLOBIN A1C; Future    2. Mixed dyslipidemia  -Routine blood work showed elevated LDL, however she also has an illness of diabetes for which she should be on statin therapy  -Will start Lipitor 20 mg daily  -Repeat blood work in 3 months  - atorvastatin (LIPITOR) 20 MG Tab; Take 1 Tablet by mouth every evening.  Dispense: 100 Tablet; Refill: 3  - Lipid Profile; Future    3. Osteopenia  of lumbar spine  -Routine DEXA scan showed osteopenia of the lumbar spine  -Recommended weightbearing exercises, calcium and vitamin D supplementation.  -We will consider biphosphonates in the near future    4. Elevated TSH  -Blood work showed borderline elevated TSH, however the patient is not exhibiting any signs or symptoms of hypothyroidism  -Will repeat blood work in the future      Return in about 3 months (around 5/11/2025) for Meds, Labs, DM.    Please note that this dictation was created using voice recognition software. I have made every reasonable attempt to correct obvious errors, but I expect that there are errors of grammar and possibly content that I did not discover before finalizing the note.

## 2025-05-17 ENCOUNTER — HOSPITAL ENCOUNTER (OUTPATIENT)
Dept: LAB | Facility: MEDICAL CENTER | Age: 75
End: 2025-05-17
Attending: STUDENT IN AN ORGANIZED HEALTH CARE EDUCATION/TRAINING PROGRAM
Payer: MEDICARE

## 2025-05-17 DIAGNOSIS — E11.9 TYPE 2 DIABETES MELLITUS WITHOUT COMPLICATION, WITHOUT LONG-TERM CURRENT USE OF INSULIN (HCC): ICD-10-CM

## 2025-05-17 DIAGNOSIS — E78.2 MIXED DYSLIPIDEMIA: ICD-10-CM

## 2025-05-17 LAB
CHOLEST SERPL-MCNC: 198 MG/DL (ref 100–199)
CREAT UR-MCNC: 96.5 MG/DL
EST. AVERAGE GLUCOSE BLD GHB EST-MCNC: 148 MG/DL
HBA1C MFR BLD: 6.8 % (ref 4–5.6)
HDLC SERPL-MCNC: 45 MG/DL
LDLC SERPL CALC-MCNC: 127 MG/DL
MICROALBUMIN UR-MCNC: <1.2 MG/DL
MICROALBUMIN/CREAT UR: NORMAL MG/G (ref 0–30)
TRIGL SERPL-MCNC: 131 MG/DL (ref 0–149)

## 2025-05-17 PROCEDURE — 82043 UR ALBUMIN QUANTITATIVE: CPT

## 2025-05-17 PROCEDURE — 83036 HEMOGLOBIN GLYCOSYLATED A1C: CPT | Mod: GA

## 2025-05-17 PROCEDURE — 82570 ASSAY OF URINE CREATININE: CPT

## 2025-05-17 PROCEDURE — 36415 COLL VENOUS BLD VENIPUNCTURE: CPT

## 2025-05-17 PROCEDURE — 80061 LIPID PANEL: CPT

## 2025-05-20 ENCOUNTER — OFFICE VISIT (OUTPATIENT)
Dept: MEDICAL GROUP | Age: 75
End: 2025-05-20
Payer: MEDICARE

## 2025-05-20 VITALS
WEIGHT: 162 LBS | TEMPERATURE: 99 F | BODY MASS INDEX: 29.81 KG/M2 | SYSTOLIC BLOOD PRESSURE: 121 MMHG | HEART RATE: 80 BPM | OXYGEN SATURATION: 93 % | HEIGHT: 62 IN | DIASTOLIC BLOOD PRESSURE: 62 MMHG

## 2025-05-20 DIAGNOSIS — M85.88 OSTEOPENIA OF LUMBAR SPINE: ICD-10-CM

## 2025-05-20 DIAGNOSIS — E78.2 MIXED DYSLIPIDEMIA: ICD-10-CM

## 2025-05-20 DIAGNOSIS — E11.9 TYPE 2 DIABETES MELLITUS WITHOUT COMPLICATION, WITHOUT LONG-TERM CURRENT USE OF INSULIN (HCC): Primary | ICD-10-CM

## 2025-05-20 DIAGNOSIS — R79.89 ELEVATED TSH: ICD-10-CM

## 2025-05-20 LAB — RETINAL SCREEN: NORMAL

## 2025-05-20 PROCEDURE — 99214 OFFICE O/P EST MOD 30 MIN: CPT | Performed by: STUDENT IN AN ORGANIZED HEALTH CARE EDUCATION/TRAINING PROGRAM

## 2025-05-20 PROCEDURE — 3074F SYST BP LT 130 MM HG: CPT | Performed by: STUDENT IN AN ORGANIZED HEALTH CARE EDUCATION/TRAINING PROGRAM

## 2025-05-20 PROCEDURE — 3078F DIAST BP <80 MM HG: CPT | Performed by: STUDENT IN AN ORGANIZED HEALTH CARE EDUCATION/TRAINING PROGRAM

## 2025-05-20 ASSESSMENT — ENCOUNTER SYMPTOMS
DEPRESSION: 0
BRUISES/BLEEDS EASILY: 0
DOUBLE VISION: 0
SENSORY CHANGE: 0
BACK PAIN: 0
WHEEZING: 0
CHILLS: 0
BLURRED VISION: 0
PALPITATIONS: 0
FEVER: 0
ABDOMINAL PAIN: 0
HEADACHES: 0
DIZZINESS: 0
COUGH: 0
WEAKNESS: 0
SHORTNESS OF BREATH: 0
BLOOD IN STOOL: 0
ORTHOPNEA: 0

## 2025-05-20 ASSESSMENT — LIFESTYLE VARIABLES: SUBSTANCE_ABUSE: 0

## 2025-05-20 ASSESSMENT — FIBROSIS 4 INDEX: FIB4 SCORE: 0.79

## 2025-05-20 NOTE — PATIENT INSTRUCTIONS
-Continue home meds  -Regular exercise, strict diabetic diet  -Follow up in early August  -Blood work before appt

## 2025-05-20 NOTE — PROGRESS NOTES
"Subjective:     CC: Lab review follow-up    HPI:   History of Present Illness  The patient is a 35-year-old female who presents for follow-up of diabetes, osteopenia, and elevated cholesterol.    Repeated blood work showed unchanged A1c 6.8%, also unchanged lipid profile with mild elevated LDL cholesterol.  Patient states that she is not taking statin therapy and would like to discontinue medication.    She has been managing her cholesterol levels through dietary modifications and has expressed a preference to avoid pharmacological interventions unless absolutely necessary. She has recently incorporated a vitamin regimen into her routine, specifically designed for individuals with diabetes.    She has not yet undergone an ophthalmological examination this year.    She has osteopenia in her lumbar spine.       ROS:  Review of Systems   Constitutional:  Negative for chills, fever and malaise/fatigue.   HENT:  Negative for nosebleeds and tinnitus.    Eyes:  Negative for blurred vision and double vision.   Respiratory:  Negative for cough, shortness of breath and wheezing.    Cardiovascular:  Negative for chest pain, palpitations, orthopnea and leg swelling.   Gastrointestinal:  Negative for abdominal pain, blood in stool and melena.   Genitourinary:  Negative for dysuria, frequency and urgency.   Musculoskeletal:  Negative for back pain and joint pain.   Skin:  Negative for rash.   Neurological:  Negative for dizziness, sensory change, weakness and headaches.   Endo/Heme/Allergies:  Does not bruise/bleed easily.   Psychiatric/Behavioral:  Negative for depression, substance abuse and suicidal ideas.        Objective:     Exam:  /62 (BP Location: Right arm, Patient Position: Sitting, BP Cuff Size: Large adult)   Pulse 80   Temp 37.2 °C (99 °F) (Temporal)   Ht 1.575 m (5' 2\")   Wt 73.5 kg (162 lb)   SpO2 93%   BMI 29.63 kg/m²  Body mass index is 29.63 kg/m².    Physical Exam  Vitals reviewed.   Constitutional: "       General: She is not in acute distress.  HENT:      Head: Normocephalic and atraumatic.      Mouth/Throat:      Mouth: Mucous membranes are moist.   Eyes:      General: No scleral icterus.     Extraocular Movements: Extraocular movements intact.      Pupils: Pupils are equal, round, and reactive to light.   Cardiovascular:      Rate and Rhythm: Normal rate and regular rhythm.      Pulses: Normal pulses.      Heart sounds: Normal heart sounds. No murmur heard.  Pulmonary:      Effort: Pulmonary effort is normal. No respiratory distress.      Breath sounds: Normal breath sounds. No wheezing.   Abdominal:      General: There is no distension.      Palpations: Abdomen is soft.      Tenderness: There is no abdominal tenderness. There is no guarding or rebound.   Musculoskeletal:      Cervical back: Normal range of motion and neck supple.      Right lower leg: No edema.      Left lower leg: No edema.   Skin:     General: Skin is warm.      Capillary Refill: Capillary refill takes less than 2 seconds.      Coloration: Skin is not jaundiced.      Findings: No bruising.   Neurological:      General: No focal deficit present.      Mental Status: She is alert.      Cranial Nerves: No cranial nerve deficit.      Sensory: No sensory deficit.   Psychiatric:         Mood and Affect: Mood normal.         Behavior: Behavior normal.       Labs: Reviewed    Assessment & Plan:       1. Type 2 diabetes mellitus without complication, without long-term current use of insulin ([facility]) (Primary)  Her blood glucose levels remain stable.  Current A1c unchanged 6.8%.  She takes only metformin 850 mg daily.  She will continue her current medication regimen without any changes. An eye examination will be conducted today to check for diabetic retinopathy. A foot examination was performed, showing good sensation in all parts of her feet.  Monofilament testing with a 10 gram force: sensation intact: intact bilaterally  Visual Inspection: Feet  without maceration, ulcers, fissures.  Pedal pulses: intact bilaterally   - POCT Retinal Eye Exam  - Comp Metabolic Panel; Future  - Hemoglobin A1c; Future  - Diabetic Monofilament Lower Extremity Exam    2.  Mixed dyslipidemia  She has chosen not to take cholesterol medication at this time. It was explained that having diabetes increases cardiovascular risk, and cholesterol medication is recommended regardless of lab numbers. Her cholesterol levels will be monitored in the future.    3. Osteopenia of lumbar spine  She has been diagnosed with osteopenia in the lumbar region. She is advised to take calcium and vitamin D supplements and engage in weight-bearing exercises such as walking.  Biphosphonate's also will be consider, however patient would like to take the least amount of drugs at this moment.    3. Elevated TSH  Previous thyroid function tests showed slightly elevated levels. Thyroid function tests will be repeated at the end of August 2025 to ensure proper thyroid function.  She is not exhibiting any signs or symptoms of hyperthyroidism or hypothyroidism.  - TSH; Future  - FREE THYROXINE; Future       Return in about 3 months (around 8/20/2025) for Labs, DM.    Please note that this dictation was created using voice recognition software. I have made every reasonable attempt to correct obvious errors, but I expect that there are errors of grammar and possibly content that I did not discover before finalizing the note.

## 2025-05-27 DIAGNOSIS — H40.10X0 OPEN-ANGLE GLAUCOMA OF BOTH EYES, UNSPECIFIED GLAUCOMA STAGE, UNSPECIFIED OPEN-ANGLE GLAUCOMA TYPE: ICD-10-CM

## 2025-05-27 DIAGNOSIS — E11.3299 NPDR (NONPROLIFERATIVE DIABETIC RETINOPATHY) (HCC): Primary | ICD-10-CM

## 2025-06-05 NOTE — Clinical Note
REFERRAL APPROVAL NOTICE         Sent on June 5, 2025                   Shraddha Chong  [address] [place] [address]                   Dear Ms. Chong,    After a careful review of the medical information and benefit coverage, [facility] has processed your referral. See below for additional details.    If applicable, you must be actively enrolled with your insurance for coverage of the authorized service. If you have any questions regarding your coverage, please contact your insurance directly.    REFERRAL INFORMATION   Referral #:  [id number]  Referred-To Provider    Referred-By Provider:  Ophthalmology    Inocente James M.D.   [facility] [place]      [address] [place] [address]  [phone] [address] [place] [address]  [phone]    Referral Start Date:  05/27/2025  Referral End Date:   05/27/2026             SCHEDULING  If you do not already have an appointment, please call [phone] to make an appointment.     MORE INFORMATION  If you do not already have a [employer] account, sign up at: [email or web address]  You can access your medical information, make appointments, see lab results, billing information, and more.  If you have questions regarding this referral, please contact  the [facility] Referrals department at:             [phone]. Monday - Friday 8:00AM - 5:00PM.     Sincerely,    [facility]

## 2025-08-27 ENCOUNTER — HOSPITAL ENCOUNTER (OUTPATIENT)
Dept: LAB | Facility: MEDICAL CENTER | Age: 75
End: 2025-08-27
Attending: STUDENT IN AN ORGANIZED HEALTH CARE EDUCATION/TRAINING PROGRAM
Payer: MEDICARE

## 2025-08-27 DIAGNOSIS — R79.89 ELEVATED TSH: ICD-10-CM

## 2025-08-27 DIAGNOSIS — E11.9 TYPE 2 DIABETES MELLITUS WITHOUT COMPLICATION, WITHOUT LONG-TERM CURRENT USE OF INSULIN (HCC): ICD-10-CM

## 2025-08-27 LAB
ALBUMIN SERPL BCP-MCNC: 4.1 G/DL (ref 3.2–4.9)
ALBUMIN/GLOB SERPL: 1.3 G/DL
ALP SERPL-CCNC: 75 U/L (ref 30–99)
ALT SERPL-CCNC: 36 U/L (ref 2–50)
ANION GAP SERPL CALC-SCNC: 11 MMOL/L (ref 7–16)
AST SERPL-CCNC: 28 U/L (ref 12–45)
BILIRUB SERPL-MCNC: 0.4 MG/DL (ref 0.1–1.5)
BUN SERPL-MCNC: 16 MG/DL (ref 8–22)
CALCIUM ALBUM COR SERPL-MCNC: 9.2 MG/DL (ref 8.5–10.5)
CALCIUM SERPL-MCNC: 9.3 MG/DL (ref 8.5–10.5)
CHLORIDE SERPL-SCNC: 105 MMOL/L (ref 96–112)
CO2 SERPL-SCNC: 23 MMOL/L (ref 20–33)
CREAT SERPL-MCNC: 0.75 MG/DL (ref 0.5–1.4)
EST. AVERAGE GLUCOSE BLD GHB EST-MCNC: 154 MG/DL
GFR SERPLBLD CREATININE-BSD FMLA CKD-EPI: 82 ML/MIN/1.73 M 2
GLOBULIN SER CALC-MCNC: 3.2 G/DL (ref 1.9–3.5)
GLUCOSE SERPL-MCNC: 131 MG/DL (ref 65–99)
HBA1C MFR BLD: 7 % (ref 4–5.6)
POTASSIUM SERPL-SCNC: 4.8 MMOL/L (ref 3.6–5.5)
PROT SERPL-MCNC: 7.3 G/DL (ref 6–8.2)
SODIUM SERPL-SCNC: 139 MMOL/L (ref 135–145)
T4 FREE SERPL-MCNC: 1.01 NG/DL (ref 0.93–1.7)
TSH SERPL-ACNC: 4.09 UIU/ML (ref 0.38–5.33)

## 2025-08-27 PROCEDURE — 83036 HEMOGLOBIN GLYCOSYLATED A1C: CPT | Mod: GA

## 2025-08-27 PROCEDURE — 84443 ASSAY THYROID STIM HORMONE: CPT

## 2025-08-27 PROCEDURE — 36415 COLL VENOUS BLD VENIPUNCTURE: CPT

## 2025-08-27 PROCEDURE — 84439 ASSAY OF FREE THYROXINE: CPT

## 2025-08-27 PROCEDURE — 80053 COMPREHEN METABOLIC PANEL: CPT

## 2025-09-04 ENCOUNTER — APPOINTMENT (OUTPATIENT)
Dept: MEDICAL GROUP | Age: 75
End: 2025-09-04
Payer: MEDICARE